# Patient Record
Sex: FEMALE | Race: WHITE | NOT HISPANIC OR LATINO | Employment: FULL TIME | URBAN - NONMETROPOLITAN AREA
[De-identification: names, ages, dates, MRNs, and addresses within clinical notes are randomized per-mention and may not be internally consistent; named-entity substitution may affect disease eponyms.]

---

## 2022-03-10 ENCOUNTER — LAB (OUTPATIENT)
Dept: LAB | Facility: HOSPITAL | Age: 42
End: 2022-03-10

## 2022-03-10 ENCOUNTER — TRANSCRIBE ORDERS (OUTPATIENT)
Dept: ADMINISTRATIVE | Facility: HOSPITAL | Age: 42
End: 2022-03-10

## 2022-03-10 DIAGNOSIS — E03.9 HYPOTHYROIDISM, UNSPECIFIED TYPE: Primary | ICD-10-CM

## 2022-03-10 DIAGNOSIS — E03.9 HYPOTHYROIDISM, UNSPECIFIED TYPE: ICD-10-CM

## 2022-03-10 PROCEDURE — 84480 ASSAY TRIIODOTHYRONINE (T3): CPT

## 2022-03-10 PROCEDURE — 84439 ASSAY OF FREE THYROXINE: CPT

## 2022-03-10 PROCEDURE — 36415 COLL VENOUS BLD VENIPUNCTURE: CPT

## 2022-03-10 PROCEDURE — 84443 ASSAY THYROID STIM HORMONE: CPT

## 2022-03-11 LAB
T3 SERPL-MCNC: 145 NG/DL (ref 80–200)
T4 FREE SERPL-MCNC: 1.06 NG/DL (ref 0.93–1.7)
TSH SERPL DL<=0.05 MIU/L-ACNC: 18 UIU/ML (ref 0.27–4.2)

## 2022-05-02 ENCOUNTER — TRANSCRIBE ORDERS (OUTPATIENT)
Dept: ADMINISTRATIVE | Facility: HOSPITAL | Age: 42
End: 2022-05-02

## 2022-05-02 DIAGNOSIS — E03.9 HYPOTHYROIDISM (ACQUIRED): Primary | ICD-10-CM

## 2022-06-08 ENCOUNTER — TRANSCRIBE ORDERS (OUTPATIENT)
Dept: ADMINISTRATIVE | Facility: HOSPITAL | Age: 42
End: 2022-06-08

## 2022-06-08 ENCOUNTER — LAB (OUTPATIENT)
Dept: LAB | Facility: HOSPITAL | Age: 42
End: 2022-06-08

## 2022-06-08 DIAGNOSIS — E55.9 VITAMIN D DEFICIENCY, UNSPECIFIED: ICD-10-CM

## 2022-06-08 DIAGNOSIS — E03.9 HYPOTHYROIDISM, UNSPECIFIED TYPE: Primary | ICD-10-CM

## 2022-06-08 DIAGNOSIS — E53.8 DEFICIENCY OF OTHER SPECIFIED B GROUP VITAMINS: ICD-10-CM

## 2022-06-08 LAB
25(OH)D3 SERPL-MCNC: 20.4 NG/ML (ref 30–100)
FOLATE SERPL-MCNC: 5.71 NG/ML (ref 4.78–24.2)
T3 SERPL-MCNC: 144 NG/DL (ref 80–200)
T3FREE SERPL-MCNC: 3.32 PG/ML (ref 2–4.4)
T4 FREE SERPL-MCNC: 1.11 NG/DL (ref 0.93–1.7)
TSH SERPL DL<=0.05 MIU/L-ACNC: 10.3 UIU/ML (ref 0.27–4.2)
VIT B12 BLD-MCNC: 358 PG/ML (ref 211–946)

## 2022-06-08 PROCEDURE — 82746 ASSAY OF FOLIC ACID SERUM: CPT | Performed by: NURSE PRACTITIONER

## 2022-06-08 PROCEDURE — 36415 COLL VENOUS BLD VENIPUNCTURE: CPT | Performed by: NURSE PRACTITIONER

## 2022-06-08 PROCEDURE — 84482 T3 REVERSE: CPT | Performed by: NURSE PRACTITIONER

## 2022-06-08 PROCEDURE — 82607 VITAMIN B-12: CPT | Performed by: NURSE PRACTITIONER

## 2022-06-08 PROCEDURE — 84439 ASSAY OF FREE THYROXINE: CPT | Performed by: NURSE PRACTITIONER

## 2022-06-08 PROCEDURE — 82306 VITAMIN D 25 HYDROXY: CPT | Performed by: NURSE PRACTITIONER

## 2022-06-08 PROCEDURE — 84443 ASSAY THYROID STIM HORMONE: CPT | Performed by: NURSE PRACTITIONER

## 2022-06-08 PROCEDURE — 84481 FREE ASSAY (FT-3): CPT | Performed by: NURSE PRACTITIONER

## 2022-06-17 LAB — T3REVERSE SERPL-MCNC: 19.1 NG/DL (ref 9.2–24.1)

## 2022-07-21 ENCOUNTER — TRANSCRIBE ORDERS (OUTPATIENT)
Dept: ADMINISTRATIVE | Facility: HOSPITAL | Age: 42
End: 2022-07-21

## 2022-07-21 DIAGNOSIS — E03.9 HYPOTHYROIDISM, UNSPECIFIED TYPE: Primary | ICD-10-CM

## 2022-08-04 ENCOUNTER — LAB (OUTPATIENT)
Dept: LAB | Facility: HOSPITAL | Age: 42
End: 2022-08-04

## 2022-08-04 DIAGNOSIS — E03.9 HYPOTHYROIDISM, UNSPECIFIED TYPE: ICD-10-CM

## 2022-08-04 PROCEDURE — 36415 COLL VENOUS BLD VENIPUNCTURE: CPT

## 2022-08-04 PROCEDURE — 84439 ASSAY OF FREE THYROXINE: CPT

## 2022-08-04 PROCEDURE — 84443 ASSAY THYROID STIM HORMONE: CPT

## 2022-08-04 PROCEDURE — 84482 T3 REVERSE: CPT

## 2022-08-04 PROCEDURE — 84481 FREE ASSAY (FT-3): CPT

## 2022-08-05 LAB
T3FREE SERPL-MCNC: 2.94 PG/ML (ref 2–4.4)
T4 FREE SERPL-MCNC: 1.41 NG/DL (ref 0.93–1.7)
TSH SERPL DL<=0.05 MIU/L-ACNC: 4.67 UIU/ML (ref 0.27–4.2)

## 2022-08-09 LAB — T3REVERSE SERPL-MCNC: 19.9 NG/DL (ref 9.2–24.1)

## 2022-10-20 ENCOUNTER — LAB (OUTPATIENT)
Dept: LAB | Facility: HOSPITAL | Age: 42
End: 2022-10-20

## 2022-10-20 ENCOUNTER — TRANSCRIBE ORDERS (OUTPATIENT)
Dept: ADMINISTRATIVE | Facility: HOSPITAL | Age: 42
End: 2022-10-20

## 2022-10-20 DIAGNOSIS — Z13.220 ENCOUNTER FOR SCREENING FOR LIPOID DISORDERS: ICD-10-CM

## 2022-10-20 DIAGNOSIS — E03.9 HYPOTHYROIDISM, UNSPECIFIED TYPE: ICD-10-CM

## 2022-10-20 DIAGNOSIS — Z00.00 ENCOUNTER FOR GENERAL ADULT MEDICAL EXAMINATION W/O ABNORMAL FINDINGS: Primary | ICD-10-CM

## 2022-10-20 DIAGNOSIS — Z12.31 ENCOUNTER FOR SCREENING MAMMOGRAM FOR MALIGNANT NEOPLASM OF BREAST: Primary | ICD-10-CM

## 2022-10-20 DIAGNOSIS — Z00.00 ENCOUNTER FOR GENERAL ADULT MEDICAL EXAMINATION W/O ABNORMAL FINDINGS: ICD-10-CM

## 2022-10-20 DIAGNOSIS — E55.9 VITAMIN D DEFICIENCY: ICD-10-CM

## 2022-10-20 DIAGNOSIS — Z13.1 ENCOUNTER FOR SCREENING FOR DIABETES MELLITUS: ICD-10-CM

## 2022-10-20 LAB
ALBUMIN SERPL-MCNC: 4.5 G/DL (ref 3.5–5.2)
ALBUMIN/GLOB SERPL: 1.3 G/DL
ALP SERPL-CCNC: 102 U/L (ref 39–117)
ALT SERPL W P-5'-P-CCNC: 15 U/L (ref 1–33)
ANION GAP SERPL CALCULATED.3IONS-SCNC: 11 MMOL/L (ref 5–15)
AST SERPL-CCNC: 29 U/L (ref 1–32)
AUTO MIXED CELLS #: 0.7 10*3/MM3 (ref 0.1–2.6)
AUTO MIXED CELLS %: 10.3 % (ref 0.1–24)
BILIRUB SERPL-MCNC: 0.2 MG/DL (ref 0–1.2)
BUN SERPL-MCNC: 11 MG/DL (ref 6–20)
BUN/CREAT SERPL: 15.3 (ref 7–25)
CALCIUM SPEC-SCNC: 9.6 MG/DL (ref 8.6–10.5)
CHLORIDE SERPL-SCNC: 102 MMOL/L (ref 98–107)
CHOLEST SERPL-MCNC: 172 MG/DL (ref 0–200)
CO2 SERPL-SCNC: 25 MMOL/L (ref 22–29)
CREAT SERPL-MCNC: 0.72 MG/DL (ref 0.57–1)
EGFRCR SERPLBLD CKD-EPI 2021: 106.5 ML/MIN/1.73
ERYTHROCYTE [DISTWIDTH] IN BLOOD BY AUTOMATED COUNT: 12.6 % (ref 12.3–15.4)
GLOBULIN UR ELPH-MCNC: 3.6 GM/DL
GLUCOSE SERPL-MCNC: 101 MG/DL (ref 65–99)
HBA1C MFR BLD: 5.6 % (ref 4.8–5.9)
HCT VFR BLD AUTO: 39.5 % (ref 34–46.6)
HDLC SERPL-MCNC: 47 MG/DL (ref 40–60)
HGB BLD-MCNC: 12.9 G/DL (ref 12–15.9)
LDLC SERPL CALC-MCNC: 107 MG/DL (ref 0–100)
LDLC/HDLC SERPL: 2.25 {RATIO}
LYMPHOCYTES # BLD AUTO: 2.6 10*3/MM3 (ref 0.7–3.1)
LYMPHOCYTES NFR BLD AUTO: 40.5 % (ref 19.6–45.3)
MCH RBC QN AUTO: 30.4 PG (ref 26.6–33)
MCHC RBC AUTO-ENTMCNC: 32.7 G/DL (ref 31.5–35.7)
MCV RBC AUTO: 93.2 FL (ref 79–97)
NEUTROPHILS NFR BLD AUTO: 3.2 10*3/MM3 (ref 1.7–7)
NEUTROPHILS NFR BLD AUTO: 49.2 % (ref 42.7–76)
PLATELET # BLD AUTO: 307 10*3/MM3 (ref 140–450)
PMV BLD AUTO: 11.6 FL (ref 6–12)
POTASSIUM SERPL-SCNC: 4.4 MMOL/L (ref 3.5–5.2)
PROT SERPL-MCNC: 8.1 G/DL (ref 6–8.5)
RBC # BLD AUTO: 4.24 10*6/MM3 (ref 3.77–5.28)
SODIUM SERPL-SCNC: 138 MMOL/L (ref 136–145)
TRIGL SERPL-MCNC: 96 MG/DL (ref 0–150)
VLDLC SERPL-MCNC: 18 MG/DL (ref 5–40)
WBC NRBC COR # BLD: 6.5 10*3/MM3 (ref 3.4–10.8)

## 2022-10-20 PROCEDURE — 82306 VITAMIN D 25 HYDROXY: CPT

## 2022-10-20 PROCEDURE — 36415 COLL VENOUS BLD VENIPUNCTURE: CPT

## 2022-10-20 PROCEDURE — 84481 FREE ASSAY (FT-3): CPT

## 2022-10-20 PROCEDURE — 84482 T3 REVERSE: CPT

## 2022-10-20 PROCEDURE — 84439 ASSAY OF FREE THYROXINE: CPT

## 2022-10-20 PROCEDURE — 80053 COMPREHEN METABOLIC PANEL: CPT | Performed by: NURSE PRACTITIONER

## 2022-10-20 PROCEDURE — 85025 COMPLETE CBC W/AUTO DIFF WBC: CPT

## 2022-10-20 PROCEDURE — 80061 LIPID PANEL: CPT

## 2022-10-20 PROCEDURE — 83036 HEMOGLOBIN GLYCOSYLATED A1C: CPT | Performed by: NURSE PRACTITIONER

## 2022-10-20 PROCEDURE — 84443 ASSAY THYROID STIM HORMONE: CPT

## 2022-10-21 LAB
25(OH)D3 SERPL-MCNC: 36.3 NG/ML (ref 30–100)
T3FREE SERPL-MCNC: 3.11 PG/ML (ref 2–4.4)
T4 FREE SERPL-MCNC: 1.57 NG/DL (ref 0.93–1.7)
TSH SERPL DL<=0.05 MIU/L-ACNC: 3.45 UIU/ML (ref 0.27–4.2)

## 2022-10-24 LAB — T3REVERSE SERPL-MCNC: 24.1 NG/DL (ref 9.2–24.1)

## 2022-10-27 ENCOUNTER — HOSPITAL ENCOUNTER (OUTPATIENT)
Dept: MAMMOGRAPHY | Facility: HOSPITAL | Age: 42
Discharge: HOME | End: 2022-10-27
Admitting: NURSE PRACTITIONER

## 2022-10-27 DIAGNOSIS — Z12.31 ENCOUNTER FOR SCREENING MAMMOGRAM FOR MALIGNANT NEOPLASM OF BREAST: ICD-10-CM

## 2022-10-27 PROCEDURE — 77063 BREAST TOMOSYNTHESIS BI: CPT

## 2022-10-27 PROCEDURE — 77067 SCR MAMMO BI INCL CAD: CPT

## 2023-02-02 ENCOUNTER — TRANSCRIBE ORDERS (OUTPATIENT)
Dept: ADMINISTRATIVE | Facility: HOSPITAL | Age: 43
End: 2023-02-02
Payer: COMMERCIAL

## 2023-02-02 DIAGNOSIS — E03.9 HYPOTHYROIDISM, UNSPECIFIED TYPE: Primary | ICD-10-CM

## 2023-04-27 ENCOUNTER — LAB (OUTPATIENT)
Dept: LAB | Facility: HOSPITAL | Age: 43
End: 2023-04-27
Payer: COMMERCIAL

## 2023-04-27 DIAGNOSIS — E03.9 HYPOTHYROIDISM, UNSPECIFIED TYPE: ICD-10-CM

## 2023-04-27 PROCEDURE — 36415 COLL VENOUS BLD VENIPUNCTURE: CPT

## 2023-04-27 PROCEDURE — 84443 ASSAY THYROID STIM HORMONE: CPT

## 2023-04-27 PROCEDURE — 84482 T3 REVERSE: CPT

## 2023-04-27 PROCEDURE — 84439 ASSAY OF FREE THYROXINE: CPT

## 2023-04-27 PROCEDURE — 84481 FREE ASSAY (FT-3): CPT

## 2023-04-28 LAB
T3FREE SERPL-MCNC: 3.29 PG/ML (ref 2–4.4)
T4 FREE SERPL-MCNC: 1.31 NG/DL (ref 0.93–1.7)
TSH SERPL DL<=0.05 MIU/L-ACNC: 3.46 UIU/ML (ref 0.27–4.2)

## 2023-05-04 ENCOUNTER — TRANSCRIBE ORDERS (OUTPATIENT)
Dept: ADMINISTRATIVE | Facility: HOSPITAL | Age: 43
End: 2023-05-04
Payer: COMMERCIAL

## 2023-05-04 DIAGNOSIS — E03.9 HYPOTHYROIDISM, UNSPECIFIED TYPE: Primary | ICD-10-CM

## 2023-05-08 LAB — T3REVERSE SERPL-MCNC: 15.4 NG/DL (ref 9.2–24.1)

## 2023-08-03 ENCOUNTER — TRANSCRIBE ORDERS (OUTPATIENT)
Dept: ADMINISTRATIVE | Facility: HOSPITAL | Age: 43
End: 2023-08-03
Payer: COMMERCIAL

## 2023-08-03 DIAGNOSIS — Z13.1 ENCOUNTER FOR SCREENING FOR DIABETES MELLITUS: ICD-10-CM

## 2023-08-03 DIAGNOSIS — E55.9 VITAMIN D DEFICIENCY, UNSPECIFIED: ICD-10-CM

## 2023-08-03 DIAGNOSIS — Z00.00 ROUTINE GENERAL MEDICAL EXAMINATION AT A HEALTH CARE FACILITY: Primary | ICD-10-CM

## 2023-08-03 DIAGNOSIS — Z13.220 ENCOUNTER FOR SCREENING FOR LIPOID DISORDERS: ICD-10-CM

## 2023-08-03 DIAGNOSIS — Z13.29 ENCOUNTER FOR SCREENING FOR OTHER SUSPECTED ENDOCRINE DISORDER: ICD-10-CM

## 2023-10-17 ENCOUNTER — LAB (OUTPATIENT)
Dept: LAB | Facility: HOSPITAL | Age: 43
End: 2023-10-17
Payer: COMMERCIAL

## 2023-10-17 DIAGNOSIS — Z13.1 ENCOUNTER FOR SCREENING FOR DIABETES MELLITUS: ICD-10-CM

## 2023-10-17 DIAGNOSIS — Z13.220 ENCOUNTER FOR SCREENING FOR LIPOID DISORDERS: ICD-10-CM

## 2023-10-17 DIAGNOSIS — E55.9 VITAMIN D DEFICIENCY, UNSPECIFIED: ICD-10-CM

## 2023-10-17 DIAGNOSIS — Z00.00 ROUTINE GENERAL MEDICAL EXAMINATION AT A HEALTH CARE FACILITY: ICD-10-CM

## 2023-10-17 DIAGNOSIS — Z13.29 ENCOUNTER FOR SCREENING FOR OTHER SUSPECTED ENDOCRINE DISORDER: ICD-10-CM

## 2023-10-17 DIAGNOSIS — E03.9 HYPOTHYROIDISM, UNSPECIFIED TYPE: ICD-10-CM

## 2023-10-17 LAB
25(OH)D3 SERPL-MCNC: 33.6 NG/ML (ref 30–100)
ALBUMIN SERPL-MCNC: 4.2 G/DL (ref 3.5–5)
ALBUMIN/GLOB SERPL: 1.1 G/DL (ref 1.1–2.5)
ALP SERPL-CCNC: 104 U/L (ref 24–120)
ALT SERPL W P-5'-P-CCNC: 19 U/L (ref 0–35)
ANION GAP SERPL CALCULATED.3IONS-SCNC: 8 MMOL/L (ref 4–13)
AST SERPL-CCNC: 31 U/L (ref 7–45)
AUTO MIXED CELLS #: 0.7 10*3/MM3 (ref 0.1–2.6)
AUTO MIXED CELLS %: 10.5 % (ref 0.1–24)
BILIRUB SERPL-MCNC: 0.4 MG/DL (ref 0.1–1)
BUN SERPL-MCNC: 14 MG/DL (ref 5–21)
BUN/CREAT SERPL: 17.7
CALCIUM SPEC-SCNC: 9.2 MG/DL (ref 8.4–10.4)
CHLORIDE SERPL-SCNC: 107 MMOL/L (ref 98–110)
CHOLEST SERPL-MCNC: 175 MG/DL (ref 130–200)
CO2 SERPL-SCNC: 25 MMOL/L (ref 24–31)
CREAT SERPL-MCNC: 0.79 MG/DL (ref 0.5–1.4)
EGFRCR SERPLBLD CKD-EPI 2021: 94.7 ML/MIN/1.73
ERYTHROCYTE [DISTWIDTH] IN BLOOD BY AUTOMATED COUNT: 12.4 % (ref 12.3–15.4)
GLOBULIN UR ELPH-MCNC: 3.8 GM/DL
GLUCOSE SERPL-MCNC: 111 MG/DL (ref 70–100)
HBA1C MFR BLD: 5.9 % (ref 4.8–5.9)
HCT VFR BLD AUTO: 36.6 % (ref 34–46.6)
HDLC SERPL-MCNC: 48 MG/DL
HGB BLD-MCNC: 12.2 G/DL (ref 12–15.9)
LDLC SERPL CALC-MCNC: 110 MG/DL (ref 0–99)
LDLC/HDLC SERPL: 2.25 {RATIO}
LYMPHOCYTES # BLD AUTO: 2.3 10*3/MM3 (ref 0.7–3.1)
LYMPHOCYTES NFR BLD AUTO: 34.1 % (ref 19.6–45.3)
MCH RBC QN AUTO: 31.4 PG (ref 26.6–33)
MCHC RBC AUTO-ENTMCNC: 33.3 G/DL (ref 31.5–35.7)
MCV RBC AUTO: 94.1 FL (ref 79–97)
NEUTROPHILS NFR BLD AUTO: 3.7 10*3/MM3 (ref 1.7–7)
NEUTROPHILS NFR BLD AUTO: 55.4 % (ref 42.7–76)
PLATELET # BLD AUTO: 288 10*3/MM3 (ref 140–450)
PMV BLD AUTO: 11.4 FL (ref 6–12)
POTASSIUM SERPL-SCNC: 4.1 MMOL/L (ref 3.5–5.3)
PROT SERPL-MCNC: 8 G/DL (ref 6.3–8.7)
RBC # BLD AUTO: 3.89 10*6/MM3 (ref 3.77–5.28)
SODIUM SERPL-SCNC: 140 MMOL/L (ref 135–145)
T3FREE SERPL-MCNC: 3.3 PG/ML (ref 2–4.4)
T4 FREE SERPL-MCNC: 1.43 NG/DL (ref 0.93–1.7)
TRIGL SERPL-MCNC: 94 MG/DL (ref 0–149)
TSH SERPL DL<=0.05 MIU/L-ACNC: 4.16 UIU/ML (ref 0.27–4.2)
VLDLC SERPL-MCNC: 17 MG/DL (ref 5–40)
WBC NRBC COR # BLD: 6.7 10*3/MM3 (ref 3.4–10.8)

## 2023-10-17 PROCEDURE — 82306 VITAMIN D 25 HYDROXY: CPT

## 2023-10-17 PROCEDURE — 83036 HEMOGLOBIN GLYCOSYLATED A1C: CPT

## 2023-10-17 PROCEDURE — 84481 FREE ASSAY (FT-3): CPT

## 2023-10-17 PROCEDURE — 80053 COMPREHEN METABOLIC PANEL: CPT

## 2023-10-17 PROCEDURE — 80061 LIPID PANEL: CPT

## 2023-10-17 PROCEDURE — 84443 ASSAY THYROID STIM HORMONE: CPT

## 2023-10-17 PROCEDURE — 84439 ASSAY OF FREE THYROXINE: CPT

## 2023-10-17 PROCEDURE — 85025 COMPLETE CBC W/AUTO DIFF WBC: CPT

## 2023-10-17 PROCEDURE — 36415 COLL VENOUS BLD VENIPUNCTURE: CPT

## 2023-11-02 ENCOUNTER — TRANSCRIBE ORDERS (OUTPATIENT)
Dept: ADMINISTRATIVE | Facility: HOSPITAL | Age: 43
End: 2023-11-02
Payer: COMMERCIAL

## 2023-11-02 DIAGNOSIS — Z12.31 ENCOUNTER FOR SCREENING MAMMOGRAM FOR MALIGNANT NEOPLASM OF BREAST: Primary | ICD-10-CM

## 2023-11-19 LAB
NCCN CRITERIA FLAG: NORMAL
TYRER CUZICK SCORE: 16.3

## 2023-11-29 ENCOUNTER — HOSPITAL ENCOUNTER (OUTPATIENT)
Dept: MAMMOGRAPHY | Facility: HOSPITAL | Age: 43
Discharge: HOME | End: 2023-11-29
Admitting: NURSE PRACTITIONER
Payer: COMMERCIAL

## 2023-11-29 DIAGNOSIS — Z12.31 ENCOUNTER FOR SCREENING MAMMOGRAM FOR MALIGNANT NEOPLASM OF BREAST: ICD-10-CM

## 2023-11-29 PROCEDURE — 77063 BREAST TOMOSYNTHESIS BI: CPT

## 2023-11-29 PROCEDURE — 77067 SCR MAMMO BI INCL CAD: CPT

## 2024-01-23 ENCOUNTER — TRANSCRIBE ORDERS (OUTPATIENT)
Dept: ADMINISTRATIVE | Facility: HOSPITAL | Age: 44
End: 2024-01-23
Payer: COMMERCIAL

## 2024-01-23 DIAGNOSIS — E78.5 HYPERLIPIDEMIA, UNSPECIFIED HYPERLIPIDEMIA TYPE: ICD-10-CM

## 2024-01-23 DIAGNOSIS — E03.9 HYPOTHYROIDISM, UNSPECIFIED TYPE: Primary | ICD-10-CM

## 2024-03-28 ENCOUNTER — LAB (OUTPATIENT)
Dept: LAB | Facility: HOSPITAL | Age: 44
End: 2024-03-28
Payer: COMMERCIAL

## 2024-03-28 DIAGNOSIS — E78.5 HYPERLIPIDEMIA, UNSPECIFIED HYPERLIPIDEMIA TYPE: ICD-10-CM

## 2024-03-28 DIAGNOSIS — E03.9 HYPOTHYROIDISM, UNSPECIFIED TYPE: ICD-10-CM

## 2024-03-28 LAB
CHOLEST SERPL-MCNC: 169 MG/DL (ref 130–200)
HDLC SERPL-MCNC: 44 MG/DL
LDLC SERPL CALC-MCNC: 110 MG/DL (ref 0–99)
LDLC/HDLC SERPL: 2.48 {RATIO}
T3FREE SERPL-MCNC: 3.16 PG/ML (ref 2–4.4)
T4 FREE SERPL-MCNC: 1.27 NG/DL (ref 0.93–1.7)
TRIGL SERPL-MCNC: 79 MG/DL (ref 0–149)
TSH SERPL DL<=0.05 MIU/L-ACNC: 8.38 UIU/ML (ref 0.27–4.2)
VLDLC SERPL-MCNC: 15 MG/DL (ref 5–40)

## 2024-03-28 PROCEDURE — 80061 LIPID PANEL: CPT

## 2024-03-28 PROCEDURE — 84481 FREE ASSAY (FT-3): CPT

## 2024-03-28 PROCEDURE — 84439 ASSAY OF FREE THYROXINE: CPT

## 2024-03-28 PROCEDURE — 36415 COLL VENOUS BLD VENIPUNCTURE: CPT

## 2024-03-28 PROCEDURE — 84443 ASSAY THYROID STIM HORMONE: CPT

## 2024-04-25 ENCOUNTER — TRANSCRIBE ORDERS (OUTPATIENT)
Dept: ADMINISTRATIVE | Facility: HOSPITAL | Age: 44
End: 2024-04-25
Payer: COMMERCIAL

## 2024-04-25 DIAGNOSIS — E03.9 HYPOTHYROIDISM, UNSPECIFIED TYPE: Primary | ICD-10-CM

## 2024-04-25 DIAGNOSIS — E55.9 VITAMIN D DEFICIENCY, UNSPECIFIED: ICD-10-CM

## 2024-07-08 ENCOUNTER — LAB (OUTPATIENT)
Dept: LAB | Facility: HOSPITAL | Age: 44
End: 2024-07-08
Payer: COMMERCIAL

## 2024-07-08 DIAGNOSIS — E55.9 VITAMIN D DEFICIENCY, UNSPECIFIED: ICD-10-CM

## 2024-07-08 DIAGNOSIS — E03.9 HYPOTHYROIDISM, UNSPECIFIED TYPE: ICD-10-CM

## 2024-07-08 PROCEDURE — 84443 ASSAY THYROID STIM HORMONE: CPT

## 2024-07-08 PROCEDURE — 84482 T3 REVERSE: CPT

## 2024-07-08 PROCEDURE — 36415 COLL VENOUS BLD VENIPUNCTURE: CPT

## 2024-07-08 PROCEDURE — 82306 VITAMIN D 25 HYDROXY: CPT

## 2024-07-08 PROCEDURE — 84481 FREE ASSAY (FT-3): CPT

## 2024-07-08 PROCEDURE — 84439 ASSAY OF FREE THYROXINE: CPT

## 2024-07-09 LAB
25(OH)D3 SERPL-MCNC: 33.7 NG/ML (ref 30–100)
T3FREE SERPL-MCNC: 2.8 PG/ML (ref 2–4.4)
T4 FREE SERPL-MCNC: 1.33 NG/DL (ref 0.92–1.68)
TSH SERPL DL<=0.05 MIU/L-ACNC: 6.56 UIU/ML (ref 0.27–4.2)

## 2024-07-13 LAB — T3REVERSE SERPL-MCNC: 27.8 NG/DL (ref 9.2–24.1)

## 2024-07-25 ENCOUNTER — TRANSCRIBE ORDERS (OUTPATIENT)
Dept: ADMINISTRATIVE | Facility: HOSPITAL | Age: 44
End: 2024-07-25
Payer: COMMERCIAL

## 2024-07-25 DIAGNOSIS — E03.9 HYPOTHYROIDISM, UNSPECIFIED TYPE: Primary | ICD-10-CM

## 2024-09-18 ENCOUNTER — LAB (OUTPATIENT)
Dept: LAB | Facility: HOSPITAL | Age: 44
End: 2024-09-18
Payer: COMMERCIAL

## 2024-09-18 DIAGNOSIS — E03.9 HYPOTHYROIDISM, UNSPECIFIED TYPE: ICD-10-CM

## 2024-09-18 LAB
T3FREE SERPL-MCNC: 2.96 PG/ML (ref 2–4.4)
T4 FREE SERPL-MCNC: 1.46 NG/DL (ref 0.92–1.68)
TSH SERPL DL<=0.05 MIU/L-ACNC: 3.85 UIU/ML (ref 0.27–4.2)

## 2024-09-18 PROCEDURE — 84443 ASSAY THYROID STIM HORMONE: CPT

## 2024-09-18 PROCEDURE — 84439 ASSAY OF FREE THYROXINE: CPT

## 2024-09-18 PROCEDURE — 36415 COLL VENOUS BLD VENIPUNCTURE: CPT

## 2024-09-18 PROCEDURE — 84481 FREE ASSAY (FT-3): CPT

## 2024-09-18 PROCEDURE — 84482 T3 REVERSE: CPT

## 2024-09-22 LAB — T3REVERSE SERPL-MCNC: 28.6 NG/DL (ref 9.2–24.1)

## 2025-03-11 ENCOUNTER — TRANSCRIBE ORDERS (OUTPATIENT)
Dept: ADMINISTRATIVE | Facility: HOSPITAL | Age: 45
End: 2025-03-11
Payer: COMMERCIAL

## 2025-03-11 DIAGNOSIS — Z00.00 ROUTINE GENERAL MEDICAL EXAMINATION AT A HEALTH CARE FACILITY: Primary | ICD-10-CM

## 2025-03-11 DIAGNOSIS — E03.9 ACQUIRED HYPOTHYROIDISM: ICD-10-CM

## 2025-03-11 DIAGNOSIS — E55.9 AVITAMINOSIS D: ICD-10-CM

## 2025-03-25 ENCOUNTER — LAB (OUTPATIENT)
Dept: LAB | Facility: HOSPITAL | Age: 45
End: 2025-03-25
Payer: COMMERCIAL

## 2025-03-25 DIAGNOSIS — Z00.00 ROUTINE GENERAL MEDICAL EXAMINATION AT A HEALTH CARE FACILITY: ICD-10-CM

## 2025-03-25 DIAGNOSIS — E03.9 ACQUIRED HYPOTHYROIDISM: ICD-10-CM

## 2025-03-25 DIAGNOSIS — E55.9 AVITAMINOSIS D: ICD-10-CM

## 2025-03-25 LAB
25(OH)D3 SERPL-MCNC: 59.6 NG/ML (ref 30–100)
ALBUMIN SERPL-MCNC: 4.2 G/DL (ref 3.5–5)
ALBUMIN/GLOB SERPL: 1.1 G/DL (ref 1.1–2.5)
ALP SERPL-CCNC: 95 U/L (ref 24–120)
ALT SERPL W P-5'-P-CCNC: 17 U/L (ref 0–35)
ANION GAP SERPL CALCULATED.3IONS-SCNC: 7 MMOL/L (ref 4–13)
AST SERPL-CCNC: 29 U/L (ref 7–45)
AUTO MIXED CELLS #: 0.6 10*3/MM3 (ref 0.1–2.6)
AUTO MIXED CELLS %: 9.2 % (ref 0.1–24)
BILIRUB SERPL-MCNC: 0.3 MG/DL (ref 0.1–1)
BUN SERPL-MCNC: 17 MG/DL (ref 5–21)
BUN/CREAT SERPL: 18.9
CALCIUM SPEC-SCNC: 9.1 MG/DL (ref 8.6–10.5)
CHLORIDE SERPL-SCNC: 104 MMOL/L (ref 98–110)
CHOLEST SERPL-MCNC: 159 MG/DL (ref 130–200)
CO2 SERPL-SCNC: 26 MMOL/L (ref 24–31)
CREAT SERPL-MCNC: 0.9 MG/DL (ref 0.5–1.4)
EGFRCR SERPLBLD CKD-EPI 2021: 80 ML/MIN/1.73
ERYTHROCYTE [DISTWIDTH] IN BLOOD BY AUTOMATED COUNT: 12.4 % (ref 12.3–15.4)
GLOBULIN UR ELPH-MCNC: 3.8 GM/DL
GLUCOSE SERPL-MCNC: 119 MG/DL (ref 65–99)
HBA1C MFR BLD: 5.7 % (ref 4.8–5.9)
HCT VFR BLD AUTO: 37.5 % (ref 34–46.6)
HDLC SERPL-MCNC: 50 MG/DL
HGB BLD-MCNC: 12 G/DL (ref 12–15.9)
LDLC SERPL CALC-MCNC: 92 MG/DL (ref 0–99)
LDLC/HDLC SERPL: 1.81 {RATIO}
LYMPHOCYTES # BLD AUTO: 2.4 10*3/MM3 (ref 0.7–3.1)
LYMPHOCYTES NFR BLD AUTO: 39.8 % (ref 19.6–45.3)
MCH RBC QN AUTO: 30.1 PG (ref 26.6–33)
MCHC RBC AUTO-ENTMCNC: 32 G/DL (ref 31.5–35.7)
MCV RBC AUTO: 94 FL (ref 79–97)
NEUTROPHILS NFR BLD AUTO: 3 10*3/MM3 (ref 1.7–7)
NEUTROPHILS NFR BLD AUTO: 51 % (ref 42.7–76)
PLATELET # BLD AUTO: 319 10*3/MM3 (ref 140–450)
PMV BLD AUTO: 10.9 FL (ref 6–12)
POTASSIUM SERPL-SCNC: 4.3 MMOL/L (ref 3.5–5.3)
PROT SERPL-MCNC: 8 G/DL (ref 6.3–8.7)
RBC # BLD AUTO: 3.99 10*6/MM3 (ref 3.77–5.28)
SODIUM SERPL-SCNC: 137 MMOL/L (ref 135–145)
T3FREE SERPL-MCNC: 3.57 PG/ML (ref 2–4.4)
T4 FREE SERPL-MCNC: 1.5 NG/DL (ref 0.92–1.68)
TRIGL SERPL-MCNC: 92 MG/DL (ref 0–149)
TSH SERPL DL<=0.05 MIU/L-ACNC: 3.64 UIU/ML (ref 0.27–4.2)
VLDLC SERPL-MCNC: 17 MG/DL (ref 5–40)
WBC NRBC COR # BLD AUTO: 6 10*3/MM3 (ref 3.4–10.8)

## 2025-03-25 PROCEDURE — 84481 FREE ASSAY (FT-3): CPT

## 2025-03-25 PROCEDURE — 83036 HEMOGLOBIN GLYCOSYLATED A1C: CPT

## 2025-03-25 PROCEDURE — 80061 LIPID PANEL: CPT

## 2025-03-25 PROCEDURE — 84439 ASSAY OF FREE THYROXINE: CPT

## 2025-03-25 PROCEDURE — 80050 GENERAL HEALTH PANEL: CPT

## 2025-03-25 PROCEDURE — 82306 VITAMIN D 25 HYDROXY: CPT

## 2025-03-25 PROCEDURE — 36415 COLL VENOUS BLD VENIPUNCTURE: CPT

## 2025-04-18 ENCOUNTER — TRANSCRIBE ORDERS (OUTPATIENT)
Dept: ADMINISTRATIVE | Facility: HOSPITAL | Age: 45
End: 2025-04-18
Payer: COMMERCIAL

## 2025-04-18 DIAGNOSIS — Z12.31 ENCOUNTER FOR SCREENING MAMMOGRAM FOR MALIGNANT NEOPLASM OF BREAST: Primary | ICD-10-CM

## 2025-05-05 LAB
NCCN CRITERIA FLAG: NORMAL
TYRER CUZICK SCORE: 13.4

## 2025-05-07 ENCOUNTER — HOSPITAL ENCOUNTER (OUTPATIENT)
Dept: MAMMOGRAPHY | Facility: HOSPITAL | Age: 45
Discharge: HOME | End: 2025-05-07
Admitting: NURSE PRACTITIONER
Payer: COMMERCIAL

## 2025-05-07 DIAGNOSIS — Z12.31 ENCOUNTER FOR SCREENING MAMMOGRAM FOR MALIGNANT NEOPLASM OF BREAST: ICD-10-CM

## 2025-05-07 PROCEDURE — 77063 BREAST TOMOSYNTHESIS BI: CPT

## 2025-05-07 PROCEDURE — 77067 SCR MAMMO BI INCL CAD: CPT

## 2025-06-18 PROBLEM — Z12.11 ENCOUNTER FOR SCREENING FOR MALIGNANT NEOPLASM OF COLON: Status: ACTIVE | Noted: 2025-06-18

## 2025-06-18 NOTE — PROGRESS NOTES
Primary Physician: Valery Nickerson, JAGUAR    Chief Complaint   Patient presents with    Colon Cancer Screening     Pt presents today for evaluation for screening colonoscopy        Subjective     Kathrine Glass is a 45 y.o. female.    HPI  Colonoscopy Screening  Pt here to set up screening colonoscopy.  Pt denies any change in bowel habits. No diarrhea, constipation, abdominal pain or rectal bleeding.    No family hx colon cancer.    Past Medical History:   Diagnosis Date    Allergic rhinitis     Anxiety     GERD (gastroesophageal reflux disease)     Hypothyroidism     Osteoarthritis        Past Surgical History:   Procedure Laterality Date    CHOLECYSTECTOMY      KNEE ARTHROSCOPY Right         Current Outpatient Medications:     Ascorbic Acid (Vitamin C) 500 MG capsule, Take 1 capsule by mouth Daily., Disp: , Rfl:     buPROPion SR (WELLBUTRIN SR) 150 MG 12 hr tablet, Take 1 tablet by mouth Daily. as directed, Disp: , Rfl:     celecoxib (CeleBREX) 100 MG capsule, Take 1 capsule by mouth As Needed for Moderate Pain., Disp: , Rfl:     fluticasone (FLONASE) 50 MCG/ACT nasal spray, 1 spray by Each Nare route As Needed., Disp: , Rfl:     levothyroxine (SYNTHROID, LEVOTHROID) 125 MCG tablet, Take 1 tablet by mouth Daily., Disp: , Rfl:     loratadine (Claritin) 10 MG tablet, Take 1 tablet by mouth Daily., Disp: , Rfl:     melatonin 5 MG tablet tablet, Take 1 tablet by mouth At Night As Needed., Disp: , Rfl:     multivitamin with minerals (Multivitamin Adults) tablet tablet, Take 1 tablet by mouth Daily., Disp: , Rfl:     RABEprazole (ACIPHEX) 20 MG EC tablet, Take 1 tablet by mouth Daily., Disp: , Rfl:     Allergies   Allergen Reactions    Hydrocodone-Acetaminophen Other (See Comments)     Vicodin-caused nightmares       Social History     Socioeconomic History    Marital status: Single   Tobacco Use    Smoking status: Former     Current packs/day: 0.00     Average packs/day: 0.3 packs/day for 26.0 years (6.5 ttl pk-yrs)  "    Types: Cigarettes     Start date:      Quit date:      Years since quittin.4    Smokeless tobacco: Never   Vaping Use    Vaping status: Never Used   Substance and Sexual Activity    Alcohol use: Yes     Comment: Social    Drug use: Never    Sexual activity: Not Currently     Partners: Male     Birth control/protection: Condom, I.U.D.       Family History   Problem Relation Age of Onset    Esophageal cancer Father     Alcohol abuse Father         Not 100% sure on this    Liver disease Maternal Grandmother         SCHULTZ    Colon cancer Neg Hx     Colon polyps Neg Hx     Rectal cancer Neg Hx     Stomach cancer Neg Hx     Liver cancer Neg Hx        Review of Systems   Constitutional:  Negative for unexpected weight change.   Respiratory:  Negative for shortness of breath.    Cardiovascular:  Negative for chest pain.       Objective     /82 (BP Location: Left arm, Patient Position: Sitting, Cuff Size: Adult)   Pulse 86   Temp 97.7 °F (36.5 °C) (Infrared)   Ht 163.8 cm (64.5\")   Wt 119 kg (262 lb)   SpO2 97%   Breastfeeding No   BMI 44.28 kg/m²     Physical Exam  Vitals reviewed.   Constitutional:       Appearance: Normal appearance.   Cardiovascular:      Rate and Rhythm: Normal rate and regular rhythm.      Heart sounds: Normal heart sounds.   Pulmonary:      Effort: Pulmonary effort is normal.      Breath sounds: Normal breath sounds.   Neurological:      Mental Status: She is alert.         Lab Results - Last 18 Months   Lab Units 25  0745   GLUCOSE mg/dL 119*   BUN mg/dL 17   CREATININE mg/dL 0.90   SODIUM mmol/L 137   POTASSIUM mmol/L 4.3   CHLORIDE mmol/L 104   CO2 mmol/L 26.0   TOTAL PROTEIN g/dL 8.0   ALBUMIN g/dL 4.2   ALT (SGPT) U/L 17   AST (SGOT) U/L 29   ALK PHOS U/L 95   BILIRUBIN mg/dL 0.3   GLOBULIN gm/dL 3.8       Lab Results - Last 18 Months   Lab Units 25  0745   HEMOGLOBIN g/dL 12.0   HEMATOCRIT % 37.5   MCV fL 94.0   WBC 10*3/mm3 6.00   RDW % 12.4   MPV fL 10.9 "   PLATELETS 10*3/mm3 319       Lab Results - Last 18 Months   Lab Units 03/25/25  0745 09/18/24  0949 07/08/24  1410 07/08/24  1410 03/28/24  1044   TSH uIU/mL 3.640 3.850  --  6.560* 8.380*   VIT D 25 HYDROXY ng/ml 59.6  --    < > 33.7  --     < > = values in this interval not displayed.              IMPRESSION/PLAN:    Assessment & Plan      Problem List Items Addressed This Visit       Encounter for screening for malignant neoplasm of colon - Primary    Relevant Orders    Case Request (Completed)    Follow Anesthesia Guidelines / Protocol     Colonoscopy per Dr Karthik Apodaca Prep          ..The risks, benefits, and alternatives of colonoscopy were reviewed with the patient today.  Risks including perforation of the colon possibly requiring surgery or colostomy.  Additional risks include risk of bleeding from biopsies or removal of colon tissue.  There is also the risk of a drug reaction or problems with anesthesia.  This will be discussed with the further by the anesthesia team on the day of the procedure.  Lastly there is a possibility of missing a colon polyp or cancer.  The benefits include the diagnosis and management of disease of the colon and rectum.  Alternatives to colonoscopy include barium enema, laboratory testing, radiographic evaluation, or no intervention.  The patient verbalizes understanding and agrees.    In accordance with requirements under the Affordable Care Act, [facility] has provided pricing for all hospital services and items on each of its websites. However, a patient's actual cost may differ based on the services the patient receives to meet individual healthcare needs and based on the benefits provided under the patient’s insurance coverage.        Kathrine Harry, APRN  06/19/25  08:39 CDT    Part of this note may be an electronic transcription/translation of spoken language to printed text.

## 2025-06-19 ENCOUNTER — OFFICE VISIT (OUTPATIENT)
Dept: GASTROENTEROLOGY | Facility: CLINIC | Age: 45
End: 2025-06-19
Payer: COMMERCIAL

## 2025-06-19 VITALS
SYSTOLIC BLOOD PRESSURE: 132 MMHG | DIASTOLIC BLOOD PRESSURE: 82 MMHG | WEIGHT: 262 LBS | TEMPERATURE: 97.7 F | OXYGEN SATURATION: 97 % | HEIGHT: 65 IN | HEART RATE: 86 BPM | BODY MASS INDEX: 43.65 KG/M2

## 2025-06-19 DIAGNOSIS — Z12.11 ENCOUNTER FOR SCREENING FOR MALIGNANT NEOPLASM OF COLON: Primary | ICD-10-CM

## 2025-06-19 RX ORDER — CELECOXIB 100 MG/1
100 CAPSULE ORAL AS NEEDED
COMMUNITY

## 2025-06-19 RX ORDER — RABEPRAZOLE SODIUM 20 MG/1
20 TABLET, DELAYED RELEASE ORAL DAILY
COMMUNITY
Start: 2025-06-01

## 2025-06-19 RX ORDER — MULTIVIT-MIN/IRON/FOLIC ACID/K 18-600-40
1 CAPSULE ORAL DAILY
COMMUNITY

## 2025-06-19 RX ORDER — BUPROPION HYDROCHLORIDE 150 MG/1
150 TABLET, EXTENDED RELEASE ORAL DAILY
COMMUNITY

## 2025-06-19 RX ORDER — FLUTICASONE PROPIONATE 50 MCG
1 SPRAY, SUSPENSION (ML) NASAL AS NEEDED
COMMUNITY

## 2025-06-19 RX ORDER — LEVOTHYROXINE SODIUM 125 UG/1
125 TABLET ORAL DAILY
COMMUNITY

## 2025-06-19 RX ORDER — LORATADINE 10 MG/1
10 TABLET ORAL DAILY
COMMUNITY
Start: 2025-06-10

## 2025-08-19 ENCOUNTER — TRANSCRIBE ORDERS (OUTPATIENT)
Dept: ADMINISTRATIVE | Facility: HOSPITAL | Age: 45
End: 2025-08-19
Payer: COMMERCIAL

## 2025-08-19 DIAGNOSIS — E03.9 HYPOTHYROIDISM, UNSPECIFIED TYPE: Primary | ICD-10-CM

## 2025-08-21 ENCOUNTER — LAB (OUTPATIENT)
Dept: LAB | Facility: HOSPITAL | Age: 45
End: 2025-08-21
Payer: COMMERCIAL